# Patient Record
Sex: FEMALE | Race: BLACK OR AFRICAN AMERICAN | NOT HISPANIC OR LATINO | Employment: UNEMPLOYED | URBAN - METROPOLITAN AREA
[De-identification: names, ages, dates, MRNs, and addresses within clinical notes are randomized per-mention and may not be internally consistent; named-entity substitution may affect disease eponyms.]

---

## 2020-03-27 ENCOUNTER — OFFICE VISIT (OUTPATIENT)
Dept: URGENT CARE | Facility: CLINIC | Age: 60
End: 2020-03-27
Payer: MEDICAID

## 2020-03-27 VITALS
HEIGHT: 64 IN | OXYGEN SATURATION: 98 % | WEIGHT: 170 LBS | TEMPERATURE: 98 F | HEART RATE: 91 BPM | SYSTOLIC BLOOD PRESSURE: 154 MMHG | DIASTOLIC BLOOD PRESSURE: 85 MMHG | BODY MASS INDEX: 29.02 KG/M2

## 2020-03-27 DIAGNOSIS — R10.9 ABDOMINAL PAIN, UNSPECIFIED ABDOMINAL LOCATION: Primary | ICD-10-CM

## 2020-03-27 LAB
BILIRUB UR QL STRIP: NEGATIVE
GLUCOSE UR QL STRIP: NEGATIVE
KETONES UR QL STRIP: NEGATIVE
LEUKOCYTE ESTERASE UR QL STRIP: POSITIVE
PH, POC UA: 5.5 (ref 5–8)
POC BLOOD, URINE: NEGATIVE
POC NITRATES, URINE: NEGATIVE
PROT UR QL STRIP: NEGATIVE
SP GR UR STRIP: 1.02 (ref 1–1.03)
UROBILINOGEN UR STRIP-ACNC: ABNORMAL (ref 0.3–2.2)

## 2020-03-27 PROCEDURE — 99214 OFFICE O/P EST MOD 30 MIN: CPT | Mod: 25,S$GLB,, | Performed by: NURSE PRACTITIONER

## 2020-03-27 PROCEDURE — 99214 PR OFFICE/OUTPT VISIT, EST, LEVL IV, 30-39 MIN: ICD-10-PCS | Mod: 25,S$GLB,, | Performed by: NURSE PRACTITIONER

## 2020-03-27 PROCEDURE — 81003 URINALYSIS AUTO W/O SCOPE: CPT | Mod: QW,S$GLB,, | Performed by: NURSE PRACTITIONER

## 2020-03-27 PROCEDURE — 74019 RADEX ABDOMEN 2 VIEWS: CPT | Mod: FY,S$GLB,, | Performed by: RADIOLOGY

## 2020-03-27 PROCEDURE — 87088 URINE BACTERIA CULTURE: CPT

## 2020-03-27 PROCEDURE — 87077 CULTURE AEROBIC IDENTIFY: CPT

## 2020-03-27 PROCEDURE — 87186 SC STD MICRODIL/AGAR DIL: CPT

## 2020-03-27 PROCEDURE — 81003 POCT URINALYSIS, DIPSTICK, AUTOMATED, W/O SCOPE: ICD-10-PCS | Mod: QW,S$GLB,, | Performed by: NURSE PRACTITIONER

## 2020-03-27 PROCEDURE — 87086 URINE CULTURE/COLONY COUNT: CPT

## 2020-03-27 PROCEDURE — 74019 XR ABDOMEN FLAT AND ERECT: ICD-10-PCS | Mod: FY,S$GLB,, | Performed by: RADIOLOGY

## 2020-03-27 RX ORDER — DICYCLOMINE HYDROCHLORIDE 20 MG/1
20 TABLET ORAL EVERY 8 HOURS PRN
Qty: 30 TABLET | Refills: 0 | Status: SHIPPED | OUTPATIENT
Start: 2020-03-27

## 2020-03-27 NOTE — PATIENT INSTRUCTIONS
Go home and monitor symptoms. Take dicyclomine as needed for abdominal pain. If you get worse in any way you need to go to the ER.   Follow up with primary care physician      Unknown Causes of Abdominal Pain (Female)    The exact cause of your abdominal (stomach) pain is not clear. This does not mean that this is something to worry about. Everyone likes to know the exact cause of the problem, but sometimes with abdominal pain, there is no clear-cut cause, and this could be a good thing. The good news is that your symptoms can be treated, and you will feel better.   Your condition does not seem serious now; however, sometimes the signs of a serious problem may take more time to appear. For this reason, it is important for you to watch for any new symptoms, problems, or worsening of your condition.  Over the next few days, the abdominal pain may come and go, or be continuous. Other common symptoms can include nausea and vomiting. Sometimes it can be difficult to tell if you feel nauseous, you may just feel bad and not associate that feeling with nausea. Constipation, diarrhea, and a fever may go along with the pain.  The pain may continue even if treated correctly over the following days. Depending on how things go, sometimes the cause can become clear and may require further or different treatment. Additional evaluations, medications, or tests may also be needed.  Home care  Your healthcare provider may prescribe medicine for pain, symptoms, or an infection.  Follow the healthcare provider's instructions for taking these medicines.  General care  · Rest as much as you can until your next exam. No strenuous activities.  · Try to find positions that ease discomfort. A small pillow placed on the abdomen may help relieve pain.  · Something warm on your abdomen (such as a heating pad) may help, but be careful not to burn yourself.  Diet  · Do not force yourself to eat, especially if having cramps, vomiting, or  diarrhea.  · Water is important so you do not get dehydrated. Soup may also be good. Sports drinks may also help, especially if they are not too acidic. Make sure you don't drink sugary drinks as this can make things worse. Take liquids in small amounts. Do not guzzle them.  · Caffeine sometimes makes the pain and cramping worse.  · Avoid dairy products if you have vomiting or diarrhea.  · Don't eat large amounts at a time. Wait a few minutes between bites.  · Eat a diet low in fiber (called a low-residue diet). Foods allowed include refined breads, white rice, fruit and vegetable juices without pulp, tender meats. These foods will pass more easily through the intestine.  · Avoid whole-grain foods, whole fruits and vegetables, meats, seeds and nuts, fried or fatty foods, dairy, alcohol and spicy foods until your symptoms go away.  Follow-up care  Follow up with your healthcare provider, or as advised, if your pain does not begin to improve in the next 24 hours.  Call 911  Call 911 if any of these occur:  · Trouble breathing  · Confusion  · Fainting or loss of consciousness  · Rapid heart rate  · Seizure  When to seek medical advice  Call your healthcare provider right away if any of these occur:  · Pain gets worse or moves to the right lower abdomen  · New or worsening vomiting or diarrhea  · Swelling of the abdomen  · Unable to pass stool for more than 3 days  · Fever of 100.4ºF (38ºC) or higher, or as directed by your healthcare provider.  · Blood in vomit or bowel movements (dark red or black color)  · Jaundice (yellow color of eyes and skin)  · Weakness, dizziness  · Chest, arm, back, neck or jaw pain  · Unexpected vaginal bleeding or missed period  · Can't keep down liquids or water and are getting dehydrated  Date Last Reviewed: 12/30/2015  © 8584-4347 [employer]. [address] [place] [address], [place] [address]. All rights reserved. This information is not intended as a substitute for professional  medical care. Always follow your healthcare professional's instructions.

## 2020-03-27 NOTE — PROGRESS NOTES
"Subjective:       Patient ID: Florina Smith is a 59 y.o. female.    Vitals:  height is 5' 4" (1.626 m) and weight is 77.1 kg (170 lb). Her oral temperature is 98 °F (36.7 °C). Her blood pressure is 154/85 (abnormal) and her pulse is 91. Her oxygen saturation is 98%.     Chief Complaint: Abdominal Pain    Pt reports LLQ abd pain x 3 days. Spontaneous onset. No aggravating or alleviating factors. Describes pain as constant and sharp. Rates pain 10/10. Appetite normal. She reports daily bowel movements that are soft which is normal for her. Denies blood in stools, diarrhea, nausea, vomiting, fever, chills, cough. No urinary symptoms- no dysuria, frequency, urgency. She tried tylenol with no relief. Denies hx of diverticulitis or abd surgeries. She had an ER visit for LLQ abd pain 2 years ago but reports this pain feels different. No illicit drug use.     Abdominal Pain   This is a new problem. The current episode started in the past 7 days (3 days ago). The onset quality is sudden. The problem occurs constantly. The problem has been unchanged. The pain is located in the generalized abdominal region, periumbilical region and LLQ. The pain is at a severity of 10/10. The pain is severe. The quality of the pain is sharp, aching and cramping. The abdominal pain radiates to the pelvis. Pertinent negatives include no constipation, diarrhea, dysuria, fever, nausea or vomiting. Nothing aggravates the pain. The pain is relieved by nothing. She has tried acetaminophen for the symptoms. The treatment provided no relief. There is no history of abdominal surgery.       Constitution: Negative for appetite change, chills, sweating and fever.   HENT: Negative for trouble swallowing.    Cardiovascular: Negative for chest pain.   Respiratory: Negative for shortness of breath.    Gastrointestinal: Positive for abdominal pain. Negative for abdominal trauma, abdominal bloating, history of abdominal surgery, nausea, vomiting, " constipation, diarrhea, dark colored stools and heartburn.   Genitourinary: Negative for dysuria, missed menses and pelvic pain.   Musculoskeletal: Negative for back pain.       Objective:      Physical Exam   Constitutional: She is oriented to person, place, and time. She appears well-developed and well-nourished.   HENT:   Head: Normocephalic and atraumatic.   Right Ear: External ear normal.   Left Ear: External ear normal.   Nose: Nose normal.   Mouth/Throat: Mucous membranes are normal.   Eyes: Pupils are equal, round, and reactive to light. Conjunctivae and lids are normal.   Neck: Trachea normal, normal range of motion and full passive range of motion without pain. Neck supple.   Cardiovascular: Normal rate, regular rhythm and normal heart sounds.   Pulmonary/Chest: Effort normal and breath sounds normal. No respiratory distress.   Abdominal: Soft. Normal appearance and bowel sounds are normal. She exhibits no distension, no abdominal bruit, no pulsatile midline mass and no mass. There is no hepatosplenomegaly. There is tenderness (mild) in the left lower quadrant. There is no rigidity, no rebound, no guarding, no CVA tenderness, no tenderness at McBurney's point and negative Erwin's sign.   abd soft and non-distended. Mild tenderness over LLQ. No guarding or rebound pain. Observed pt walking normally to bathroom and laughing, asking for a sucker because she was hungry.    Musculoskeletal: Normal range of motion. She exhibits no edema.   Lymphadenopathy:     She has no cervical adenopathy.   Neurological: She is alert and oriented to person, place, and time. She has normal strength.   Skin: Skin is warm, dry, intact, not diaphoretic and not pale.   Psychiatric: She has a normal mood and affect. Her speech is normal and behavior is normal. Judgment and thought content normal. Cognition and memory are normal.   Nursing note and vitals reviewed.    Results for orders placed or performed in visit on 03/27/20    POCT Urinalysis, Dipstick, Automated, W/O Scope   Result Value Ref Range    POC Blood, Urine Negative Negative    POC Bilirubin, Urine Negative Negative    POC Urobilinogen, Urine      POC Ketones, Urine Negative Negative    POC Protein, Urine Negative Negative    POC Nitrates, Urine Negative Negative    POC Glucose, Urine Negative Negative    pH, UA 5.5 5 - 8    POC Specific Gravity, Urine 1.025 1.003 - 1.029    POC Leukocytes, Urine Positive (A) Negative     X-ray Abdomen Flat And Erect    Result Date: 3/27/2020  EXAMINATION: XR ABDOMEN FLAT AND ERECT CLINICAL HISTORY: Unspecified abdominal pain TECHNIQUE: Flat and erect AP views of the abdomen were performed. COMPARISON: None FINDINGS: The intestinal gas pattern is unremarkable with no dilated loops of bowel evident.  There is no evidence for free intraperitoneal air.  No abnormal intra-abdominal calcifications are evident.  Bony structures are grossly intact.  There are degenerative changes of the hips, more pronounced on the right than on the left.  Degenerative changes of the lower thoracic and lumbar spine are also evident.     Unremarkable intestinal gas pattern. Electronically signed by: Charly Negrete MD Date:    03/27/2020 Time:    10:29        Assessment:       1. Abdominal pain, unspecified abdominal location        Plan:         Abdominal pain, unspecified abdominal location  -     POCT Urinalysis, Dipstick, Automated, W/O Scope  -     X-Ray Abdomen Flat And Erect; Future; Expected date: 03/27/2020  -     dicyclomine (BENTYL) 20 mg tablet; Take 1 tablet (20 mg total) by mouth every 8 (eight) hours as needed (abd pain).  Dispense: 30 tablet; Refill: 0  -     Urine culture         Reviewed previous pertinent office visits, PMH, PSH, fam hx  Reviewed previous ER visit back in 2018 for abd pain. At that time they documented that she used cocaine and developed severe abd pain after that along with bloody diarrhea. When asked today pt denies any kind of  drug use.   UA showed leukocytes. Will send off for culture but without urinary symptoms I doubt this is the cause of her pain and is more likely to be contamination.  Non-surgical abd, vital signs normal, in no acute distress. Discussed case with Dr. Nava. He recommended discharge to home with close monitoring and strict ER precautions. I explained this to pt and she verbalized understanding. Recommended she follow up with primary care for persistent symptoms.   Advised on return/follow-up precautions. Advised on ER precautions. Answered all patient questions. Patient verbalized understanding and voiced agreement with current treatment plan.    Patient Instructions   Go home and monitor symptoms. Take dicyclomine as needed for abdominal pain. If you get worse in any way you need to go to the ER.   Follow up with primary care physician      Unknown Causes of Abdominal Pain (Female)    The exact cause of your abdominal (stomach) pain is not clear. This does not mean that this is something to worry about. Everyone likes to know the exact cause of the problem, but sometimes with abdominal pain, there is no clear-cut cause, and this could be a good thing. The good news is that your symptoms can be treated, and you will feel better.   Your condition does not seem serious now; however, sometimes the signs of a serious problem may take more time to appear. For this reason, it is important for you to watch for any new symptoms, problems, or worsening of your condition.  Over the next few days, the abdominal pain may come and go, or be continuous. Other common symptoms can include nausea and vomiting. Sometimes it can be difficult to tell if you feel nauseous, you may just feel bad and not associate that feeling with nausea. Constipation, diarrhea, and a fever may go along with the pain.  The pain may continue even if treated correctly over the following days. Depending on how things go, sometimes the cause can become clear  and may require further or different treatment. Additional evaluations, medications, or tests may also be needed.  Home care  Your healthcare provider may prescribe medicine for pain, symptoms, or an infection.  Follow the healthcare provider's instructions for taking these medicines.  General care  · Rest as much as you can until your next exam. No strenuous activities.  · Try to find positions that ease discomfort. A small pillow placed on the abdomen may help relieve pain.  · Something warm on your abdomen (such as a heating pad) may help, but be careful not to burn yourself.  Diet  · Do not force yourself to eat, especially if having cramps, vomiting, or diarrhea.  · Water is important so you do not get dehydrated. Soup may also be good. Sports drinks may also help, especially if they are not too acidic. Make sure you don't drink sugary drinks as this can make things worse. Take liquids in small amounts. Do not guzzle them.  · Caffeine sometimes makes the pain and cramping worse.  · Avoid dairy products if you have vomiting or diarrhea.  · Don't eat large amounts at a time. Wait a few minutes between bites.  · Eat a diet low in fiber (called a low-residue diet). Foods allowed include refined breads, white rice, fruit and vegetable juices without pulp, tender meats. These foods will pass more easily through the intestine.  · Avoid whole-grain foods, whole fruits and vegetables, meats, seeds and nuts, fried or fatty foods, dairy, alcohol and spicy foods until your symptoms go away.  Follow-up care  Follow up with your healthcare provider, or as advised, if your pain does not begin to improve in the next 24 hours.  Call 911  Call 911 if any of these occur:  · Trouble breathing  · Confusion  · Fainting or loss of consciousness  · Rapid heart rate  · Seizure  When to seek medical advice  Call your healthcare provider right away if any of these occur:  · Pain gets worse or moves to the right lower abdomen  · New or  worsening vomiting or diarrhea  · Swelling of the abdomen  · Unable to pass stool for more than 3 days  · Fever of 100.4ºF (38ºC) or higher, or as directed by your healthcare provider.  · Blood in vomit or bowel movements (dark red or black color)  · Jaundice (yellow color of eyes and skin)  · Weakness, dizziness  · Chest, arm, back, neck or jaw pain  · Unexpected vaginal bleeding or missed period  · Can't keep down liquids or water and are getting dehydrated  Date Last Reviewed: 12/30/2015  © 7949-4332 [employer]. [address] [place] [address]. All rights reserved. This information is not intended as a substitute for professional medical care. Always follow your healthcare professional's instructions.

## 2020-03-30 ENCOUNTER — TELEPHONE (OUTPATIENT)
Dept: URGENT CARE | Facility: CLINIC | Age: 60
End: 2020-03-30

## 2020-03-30 LAB — BACTERIA UR CULT: ABNORMAL

## 2020-03-30 NOTE — TELEPHONE ENCOUNTER
Left voicemail.  Attempted to call patient regarding urine culture results.  When patient returns call discussed positive urine culture results call in appropriate antibiotic as needed.

## 2020-03-30 NOTE — TELEPHONE ENCOUNTER
Called patient to discuss +E.coli in urine culture. She was not treated with abx.  No answer & no ability to leave .

## 2020-04-01 ENCOUNTER — TELEPHONE (OUTPATIENT)
Dept: URGENT CARE | Facility: CLINIC | Age: 60
End: 2020-04-01

## 2020-04-01 NOTE — TELEPHONE ENCOUNTER
Attempted to call patient twice with number on file regarding +E.coli in urine culture. She has not been treated. Left VM for patient to contact our clinic.

## 2020-04-02 ENCOUNTER — TELEPHONE (OUTPATIENT)
Dept: URGENT CARE | Facility: CLINIC | Age: 60
End: 2020-04-02

## 2020-04-02 NOTE — TELEPHONE ENCOUNTER
Called patient to discuss +E.coli in urine culture. She was not treated with abx.  No answer, left VM.